# Patient Record
Sex: FEMALE | Race: WHITE | NOT HISPANIC OR LATINO | Employment: OTHER | URBAN - NONMETROPOLITAN AREA
[De-identification: names, ages, dates, MRNs, and addresses within clinical notes are randomized per-mention and may not be internally consistent; named-entity substitution may affect disease eponyms.]

---

## 2022-11-09 ENCOUNTER — OFFICE VISIT (OUTPATIENT)
Dept: CARDIOLOGY | Facility: CLINIC | Age: 82
End: 2022-11-09

## 2022-11-09 VITALS
HEART RATE: 80 BPM | WEIGHT: 196.2 LBS | OXYGEN SATURATION: 99 % | DIASTOLIC BLOOD PRESSURE: 68 MMHG | HEIGHT: 67 IN | BODY MASS INDEX: 30.79 KG/M2 | SYSTOLIC BLOOD PRESSURE: 142 MMHG

## 2022-11-09 DIAGNOSIS — I10 ESSENTIAL HYPERTENSION: ICD-10-CM

## 2022-11-09 DIAGNOSIS — G47.33 OSA ON CPAP: ICD-10-CM

## 2022-11-09 DIAGNOSIS — Z99.89 OSA ON CPAP: ICD-10-CM

## 2022-11-09 DIAGNOSIS — F17.211 CIGARETTE NICOTINE DEPENDENCE IN REMISSION: ICD-10-CM

## 2022-11-09 DIAGNOSIS — R06.02 SHORTNESS OF BREATH: ICD-10-CM

## 2022-11-09 DIAGNOSIS — R00.0 TACHYCARDIA: Primary | ICD-10-CM

## 2022-11-09 DIAGNOSIS — E78.2 MIXED HYPERLIPIDEMIA: ICD-10-CM

## 2022-11-09 PROBLEM — C44.90 SKIN CANCER: Status: ACTIVE | Noted: 2022-11-09

## 2022-11-09 PROBLEM — E11.9 DIABETES MELLITUS: Status: ACTIVE | Noted: 2022-11-09

## 2022-11-09 PROCEDURE — 99204 OFFICE O/P NEW MOD 45 MIN: CPT | Performed by: SPECIALIST

## 2022-11-09 RX ORDER — HYDROCHLOROTHIAZIDE 25 MG/1
25 TABLET ORAL DAILY
COMMUNITY

## 2022-11-09 RX ORDER — LISINOPRIL 10 MG/1
10 TABLET ORAL DAILY
COMMUNITY

## 2022-11-09 RX ORDER — ASPIRIN 81 MG/1
81 TABLET ORAL DAILY
COMMUNITY

## 2022-11-09 NOTE — PROGRESS NOTES
Subjective   Initial consultation, tachycardia, dizziness  Tish Sam is a 82 y.o. female who presents to day for Establish Care (Here for eval. ), Rapid Heart Rate (While at PT), Hypertension, and Sleep Apnea.    CHIEF COMPLIANT  Chief Complaint   Patient presents with   • Establish Care     Here for eval.    • Rapid Heart Rate     While at PT   • Hypertension   • Sleep Apnea     Problem List:    1. Tachycardia  2. HTN  3. Hyperlipidemia  4. DARIA, uses CPAP. Followed by Dr. Kenyon  5. DM  6. Skin CA, excised      Problem List Items Addressed This Visit        Cardiac and Vasculature    Tachycardia - Primary    Relevant Orders    D-dimer, Quantitative    TSH    Cardiac Event Monitor    Adult Transthoracic Echo Complete w/ Color, Spectral and Contrast if necessary per protocol    Essential hypertension    Relevant Medications    hydroCHLOROthiazide (HYDRODIURIL) 25 MG tablet    lisinopril (PRINIVIL,ZESTRIL) 10 MG tablet    Mixed hyperlipidemia    Relevant Orders    Lipid Panel       Pulmonary and Pneumonias    Shortness of breath    Relevant Orders    D-dimer, Quantitative    Adult Transthoracic Echo Complete w/ Color, Spectral and Contrast if necessary per protocol       Sleep    DARIA on CPAP       Tobacco    Cigarette nicotine dependence in remission       HPI    Back on 13 October when she was having physical therapy all of a sudden she started feeling dizzy and lightheaded apparently the staff there checked her and she was tachycardic heart rate was reported to be about 160 and ambulance was called and she was taken to the emergency room at [facility] where she was later on discharged home she denied any episodes prior to that or after the with no dizziness or syncope or even palpitations she does not have any chest pain but she does have a little bit of shortness of breath on moderate exertion with some swelling of her lower extremities in the summer but not during the winter she smoked very  briefly in her 20s none since she also has history of hypertension diabetes she also has hyperlipidemia but she could not tolerate taking atorvastatin and she is not taking any medicine for that at the moment she also has sleep apnea she just started using CPAP about couple of months ago and she still having difficulty in tolerating the machine otherwise family history unremarkable              PRIOR MEDS  Current Outpatient Medications on File Prior to Visit   Medication Sig Dispense Refill   • aspirin 81 MG EC tablet Take 1 tablet by mouth Daily.     • hydroCHLOROthiazide (HYDRODIURIL) 25 MG tablet Take 1 tablet by mouth Daily.     • lisinopril (PRINIVIL,ZESTRIL) 10 MG tablet Take 1 tablet by mouth Daily.     • metFORMIN (GLUCOPHAGE) 500 MG tablet Take 1 tablet by mouth 2 (Two) Times a Day With Meals.     • Multiple Vitamin (MULTIVITAMINS PO) Take  by mouth.     • Unable to find 1 each 1 (One) Time. Relief factor, takes 1-2 tabs daily       No current facility-administered medications on file prior to visit.       ALLERGIES  Lipitor [atorvastatin]    HISTORY  Past Medical History:   Diagnosis Date   • Cancer (HCC)     skin   • Diabetes mellitus (HCC)    • Hyperlipidemia    • Hypertension    • Sleep apnea     uses CPAP followed Dr. Kenyon   • Tachycardia        Social History     Socioeconomic History   • Marital status: Single   Tobacco Use   • Smoking status: Never   • Smokeless tobacco: Never   Substance and Sexual Activity   • Alcohol use: Never   • Drug use: Never       Family History   Problem Relation Age of Onset   • Bone cancer Mother    • Breast cancer Mother    • Prostate cancer Father        Review of Systems   Constitutional: Positive for fatigue (easily).   HENT: Negative.    Eyes: Negative.    Respiratory: Negative.    Cardiovascular: Positive for leg swelling (feet in summer). Negative for chest pain and palpitations.   Gastrointestinal: Negative.    Endocrine: Negative.    Genitourinary: Negative.   "  Musculoskeletal: Positive for arthralgias and myalgias.   Skin: Negative.    Allergic/Immunologic: Negative.    Neurological: Positive for dizziness and light-headedness. Negative for syncope.        During PT episode   Hematological: Negative.    Psychiatric/Behavioral: Positive for sleep disturbance (up to BR).       Objective     VITALS: /68 (BP Location: Left arm, Patient Position: Sitting)   Pulse 80   Ht 170.2 cm (67\")   Wt 89 kg (196 lb 3.2 oz)   SpO2 99%   BMI 30.73 kg/m²     LABS:   Lab Results (most recent)     None          IMAGING:   No Images in the past 120 days found..    EXAM:  Physical Exam  Vitals reviewed.   Constitutional:       Appearance: She is well-developed.   HENT:      Head: Normocephalic and atraumatic.   Eyes:      Pupils: Pupils are equal, round, and reactive to light.   Neck:      Thyroid: No thyromegaly.      Vascular: No JVD.   Cardiovascular:      Rate and Rhythm: Normal rate and regular rhythm.      Heart sounds: Normal heart sounds. No murmur heard.    No friction rub. No gallop.   Pulmonary:      Effort: Pulmonary effort is normal. No respiratory distress.      Breath sounds: Normal breath sounds. No stridor. No wheezing or rales.   Chest:      Chest wall: No tenderness.   Musculoskeletal:         General: No tenderness or deformity.      Cervical back: Neck supple.   Skin:     General: Skin is warm and dry.   Neurological:      Mental Status: She is alert and oriented to person, place, and time.      Cranial Nerves: No cranial nerve deficit.      Coordination: Coordination normal.         Procedure   Procedures       Assessment & Plan     Diagnoses and all orders for this visit:    1. Tachycardia (Primary)  -     D-dimer, Quantitative; Future  -     TSH; Future  -     Cardiac Event Monitor; Future  -     Adult Transthoracic Echo Complete w/ Color, Spectral and Contrast if necessary per protocol; Future    2. Essential hypertension    3. Mixed hyperlipidemia  -     " Lipid Panel; Future    4. DARIA on CPAP    5. Shortness of breath  -     D-dimer, Quantitative; Future  -     Adult Transthoracic Echo Complete w/ Color, Spectral and Contrast if necessary per protocol; Future    6. Cigarette nicotine dependence in remission    1.  I reviewed the records including the EKG the emergency room in October 13 this shows sinus tachycardia with PACs I doubt chest PE especially tachycardia has resolved and going to however do a D-dimer for assessment of any evidence of elevated D-dimer also I am going to get an event monitor for assessment of arrhythmia and get an echocardiogram to assess cardiac function wall motion and valve morphology also think a potential to check her TSH  2.  She said that she has high cholesterol she is diabetic she could not tolerate atorvastatin we will check a lipid profile  3.  She just started using CPAP she still having a little bit of difficulty getting used to it  4.  Blood pressure is well controlled continue current management  5.  She quit smoking years ago      Return in about 4 weeks (around 12/7/2022).      Advance Care Planning   ACP discussion was held with the patient during this visit. Patient has an advance directive (not in EMR), copy requested.             MEDS ORDERED DURING VISIT:  No orders of the defined types were placed in this encounter.      As always, Penny Vidal, KARIE  I appreciate very much the opportunity to participate in the cardiovascular care of your patients. Please do not hesitate to call me with any questions with regards to Tish Sam evaluation and management.         This document has been electronically signed by Tamara Butt MD  November 9, 2022 15:33 EST

## 2022-11-23 ENCOUNTER — TELEPHONE (OUTPATIENT)
Dept: CARDIOLOGY | Facility: CLINIC | Age: 82
End: 2022-11-23

## 2022-11-23 NOTE — TELEPHONE ENCOUNTER
Caller: Tish Sam    Relationship to patient: Self    Best call back number: [phone]    Patient is needing: PT REPORTED SHE WAS CONTACTED BY THE GROUP FOR HER HOLTER MONITOR THAT SHE HAD 2 AND SHE WAS RESPONSIBLE FOR TURNING IN 2 MONITORS. PT STATED SHE ONLY HAD ONE AND SHE IS WORRIED ABOUT BEING BILLED FOR ANOTHER MONITOR WHEN ITS NOT RETURNED.

## 2022-11-23 NOTE — TELEPHONE ENCOUNTER
S/w pt and she was confused she thought she was supposed to have 2 boxes. I let her know that she has 1 box but 2 monitors. Patient stated that she is ok now.

## 2022-12-28 ENCOUNTER — LAB (OUTPATIENT)
Dept: LAB | Facility: HOSPITAL | Age: 82
End: 2022-12-28
Payer: MEDICARE

## 2022-12-28 ENCOUNTER — HOSPITAL ENCOUNTER (OUTPATIENT)
Dept: CARDIOLOGY | Facility: HOSPITAL | Age: 82
Discharge: HOME | End: 2022-12-28
Payer: MEDICARE

## 2022-12-28 VITALS — HEIGHT: 67 IN | BODY MASS INDEX: 30.8 KG/M2 | WEIGHT: 196.21 LBS

## 2022-12-28 DIAGNOSIS — R00.0 TACHYCARDIA: ICD-10-CM

## 2022-12-28 DIAGNOSIS — R06.02 SHORTNESS OF BREATH: ICD-10-CM

## 2022-12-28 DIAGNOSIS — E78.2 MIXED HYPERLIPIDEMIA: ICD-10-CM

## 2022-12-28 LAB
AORTIC DIMENSIONLESS INDEX: 0.73 (DI)
BH CV ECHO MEAS - ACS: 1.63 CM
BH CV ECHO MEAS - AO MAX PG: 7.6 MMHG
BH CV ECHO MEAS - AO MEAN PG: 4.5 MMHG
BH CV ECHO MEAS - AO ROOT DIAM: 2.8 CM
BH CV ECHO MEAS - AO V2 MAX: 138.1 CM/SEC
BH CV ECHO MEAS - AO V2 VTI: 33.8 CM
BH CV ECHO MEAS - EDV(CUBED): 84.2 ML
BH CV ECHO MEAS - EF_3D-VOL: 68 %
BH CV ECHO MEAS - ESV(CUBED): 28.9 ML
BH CV ECHO MEAS - FS: 30 %
BH CV ECHO MEAS - IVS/LVPW: 0.86 CM
BH CV ECHO MEAS - IVSD: 0.86 CM
BH CV ECHO MEAS - LA A2CS (ATRIAL LENGTH): 6.2 CM
BH CV ECHO MEAS - LA A4C LENGTH: 5.9 CM
BH CV ECHO MEAS - LA DIMENSION: 4.3 CM
BH CV ECHO MEAS - LAT PEAK E' VEL: 8 CM/SEC
BH CV ECHO MEAS - LV MASS(C)D: 132.8 GRAMS
BH CV ECHO MEAS - LV MAX PG: 4.1 MMHG
BH CV ECHO MEAS - LV MEAN PG: 1.99 MMHG
BH CV ECHO MEAS - LV V1 MAX: 100.9 CM/SEC
BH CV ECHO MEAS - LV V1 VTI: 26.3 CM
BH CV ECHO MEAS - LVIDD: 4.4 CM
BH CV ECHO MEAS - LVIDS: 3.1 CM
BH CV ECHO MEAS - LVPWD: 1 CM
BH CV ECHO MEAS - MED PEAK E' VEL: 7.5 CM/SEC
BH CV ECHO MEAS - MR MAX PG: 80.4 MMHG
BH CV ECHO MEAS - MR MAX VEL: 448.2 CM/SEC
BH CV ECHO MEAS - MV A MAX VEL: 79.6 CM/SEC
BH CV ECHO MEAS - MV DEC SLOPE: 627.3 CM/SEC2
BH CV ECHO MEAS - MV DEC TIME: 0.19 MSEC
BH CV ECHO MEAS - MV E MAX VEL: 101 CM/SEC
BH CV ECHO MEAS - MV E/A: 1.27
BH CV ECHO MEAS - MV MAX PG: 6.3 MMHG
BH CV ECHO MEAS - MV MEAN PG: 2.11 MMHG
BH CV ECHO MEAS - MV P1/2T: 59.1 MSEC
BH CV ECHO MEAS - MV V2 VTI: 29 CM
BH CV ECHO MEAS - MVA(P1/2T): 3.7 CM2
BH CV ECHO MEAS - PA V2 MAX: 85 CM/SEC
BH CV ECHO MEAS - RAP SYSTOLE: 10 MMHG
BH CV ECHO MEAS - RV MAX PG: 1.95 MMHG
BH CV ECHO MEAS - RV V1 MAX: 69.8 CM/SEC
BH CV ECHO MEAS - RV V1 VTI: 15.3 CM
BH CV ECHO MEAS - RVDD: 3.2 CM
BH CV ECHO MEAS - RVSP: 44 MMHG
BH CV ECHO MEAS - TAPSE (>1.6): 2.48 CM
BH CV ECHO MEAS - TR MAX PG: 34 MMHG
BH CV ECHO MEAS - TR MAX VEL: 291.7 CM/SEC
BH CV ECHO MEASUREMENTS AVERAGE E/E' RATIO: 13.03
BH CV XLRA - TDI S': 11.4 CM/SEC
CHOLEST SERPL-MCNC: 167 MG/DL (ref 0–200)
D DIMER PPP FEU-MCNC: 0.61 MCGFEU/ML (ref 0–0.82)
HDLC SERPL-MCNC: 54 MG/DL (ref 40–60)
LDLC SERPL CALC-MCNC: 87 MG/DL (ref 0–100)
LDLC/HDLC SERPL: 1.53 {RATIO}
LEFT ATRIUM VOLUME INDEX: 37.9 ML/M2
LEFT ATRIUM VOLUME: 76 ML
MAXIMAL PREDICTED HEART RATE: 138 BPM
SINUS: 2.7 CM
STRESS TARGET HR: 117 BPM
TRIGL SERPL-MCNC: 151 MG/DL (ref 0–150)
TSH SERPL DL<=0.05 MIU/L-ACNC: 3.4 UIU/ML (ref 0.27–4.2)
VLDLC SERPL-MCNC: 26 MG/DL (ref 5–40)

## 2022-12-28 PROCEDURE — 93306 TTE W/DOPPLER COMPLETE: CPT

## 2022-12-28 PROCEDURE — 93306 TTE W/DOPPLER COMPLETE: CPT | Performed by: SPECIALIST

## 2022-12-28 PROCEDURE — 85379 FIBRIN DEGRADATION QUANT: CPT | Performed by: SPECIALIST

## 2022-12-28 PROCEDURE — 84443 ASSAY THYROID STIM HORMONE: CPT | Performed by: SPECIALIST

## 2022-12-28 PROCEDURE — 80061 LIPID PANEL: CPT | Performed by: SPECIALIST

## 2022-12-30 ENCOUNTER — TELEPHONE (OUTPATIENT)
Dept: CARDIOLOGY | Facility: CLINIC | Age: 82
End: 2022-12-30

## 2022-12-30 NOTE — TELEPHONE ENCOUNTER
----- Message from KARIE Sommer sent at 12/29/2022  9:57 PM EST -----  Elevated triglycerides at 151.  Normal is 150.  Normal D-dimer and TSH.  Keep follow-up.

## 2022-12-30 NOTE — TELEPHONE ENCOUNTER
----- Message from CORNELIUS Simmons sent at 12/30/2022  8:44 AM EST -----  Routine follow up      Result Text     •  Left ventricular systolic function is normal. Left ventricular ejection fraction appears to be 66 - 70%.  •  Left ventricular diastolic function is consistent with (grade II w/high LAP) pseudonormalization.  •  Left atrial volume is mildly increased.  •  The right atrial cavity is mildly  dilated.  •  There is calcification of the aortic valve mainly affecting the non-coronary cusp(s).  •  Estimated right ventricular systolic pressure from tricuspid regurgitation is mildly elevated (35-45 mmHg).

## 2023-01-06 ENCOUNTER — TELEPHONE (OUTPATIENT)
Dept: CARDIOLOGY | Facility: CLINIC | Age: 83
End: 2023-01-06
Payer: MEDICARE

## 2023-01-06 NOTE — TELEPHONE ENCOUNTER
Patient called and given results of monitor.     Interpretation Summary       •  A normal monitor study.     1.  The predominant rhythm is normal sinus rhythm with an average heart rate of 82 bpm, and with the slowest heartbeat at 65 bpm at 2:39 AM on 11/12/2022, and with episodes of sinus tachycardia to heart rate of 112 bpm at 1:45 AM on 11/13/2022.  2.  No arrhythmias.  3.  No significant ST changes.

## 2023-02-01 ENCOUNTER — TELEPHONE (OUTPATIENT)
Dept: CARDIOLOGY | Facility: CLINIC | Age: 83
End: 2023-02-01
Payer: MEDICARE

## 2023-02-01 NOTE — TELEPHONE ENCOUNTER
"Marisa stated that pt has expressed concern that she had a clot and we had not addressed it.       I called pt, informed her that her echo, monitor, and labs were normal. She stated that we never contact her w/ anything and that she has to contact us. I explained that from what I can see, we had contacted her with all results. I asked if she had any specific questions, she did not respond. I explained that the only elevated lab was Trig and to try and watch red meats and fried foods, she stated she already had been told that. I explained that D Dimer was normal, so no indication of blockage or clot. I advised her to just keep her April appt and call with any concerns prior.     She stated she doesn't see why we need to see her again other than \"another charge.\" She ended the call.   "